# Patient Record
Sex: MALE | Race: BLACK OR AFRICAN AMERICAN | Employment: FULL TIME | URBAN - METROPOLITAN AREA
[De-identification: names, ages, dates, MRNs, and addresses within clinical notes are randomized per-mention and may not be internally consistent; named-entity substitution may affect disease eponyms.]

---

## 2018-05-28 PROBLEM — K52.9 COLITIS: Status: ACTIVE | Noted: 2018-05-28

## 2018-09-07 LAB — C DIFFICILE TOXIN, EIA: NORMAL

## 2018-09-08 LAB
CLOSTRIDIUM DIFFICILE DNA AMPLIFICATION: ABNORMAL
CLOSTRIDIUM DIFFICILE DNA AMPLIFICATION: ABNORMAL
ORGANISM: ABNORMAL

## 2018-11-21 LAB — C DIFFICILE TOXIN, EIA: NORMAL

## 2019-01-15 LAB — C DIFFICILE TOXIN, EIA: NORMAL

## 2019-01-16 LAB
A/G RATIO: 1.8 (ref 1.1–2.2)
ALBUMIN SERPL-MCNC: 4.6 G/DL (ref 3.4–5)
ALP BLD-CCNC: 48 U/L (ref 40–129)
ALT SERPL-CCNC: 28 U/L (ref 10–40)
ANION GAP SERPL CALCULATED.3IONS-SCNC: 14 MMOL/L (ref 3–16)
AST SERPL-CCNC: 24 U/L (ref 15–37)
BASOPHILS ABSOLUTE: 0.1 K/UL (ref 0–0.2)
BASOPHILS RELATIVE PERCENT: 0.9 %
BILIRUB SERPL-MCNC: 0.9 MG/DL (ref 0–1)
BUN BLDV-MCNC: 12 MG/DL (ref 7–20)
C-REACTIVE PROTEIN: 0.7 MG/L (ref 0–5.1)
CALCIUM SERPL-MCNC: 9.3 MG/DL (ref 8.3–10.6)
CHLORIDE BLD-SCNC: 97 MMOL/L (ref 99–110)
CO2: 23 MMOL/L (ref 21–32)
CREAT SERPL-MCNC: 1 MG/DL (ref 0.9–1.3)
EOSINOPHILS ABSOLUTE: 0.8 K/UL (ref 0–0.6)
EOSINOPHILS RELATIVE PERCENT: 8 %
GFR AFRICAN AMERICAN: >60
GFR NON-AFRICAN AMERICAN: >60
GLOBULIN: 2.5 G/DL
GLUCOSE BLD-MCNC: 94 MG/DL (ref 70–99)
HCT VFR BLD CALC: 46 % (ref 40.5–52.5)
HEMOGLOBIN: 16.2 G/DL (ref 13.5–17.5)
LYMPHOCYTES ABSOLUTE: 1.3 K/UL (ref 1–5.1)
LYMPHOCYTES RELATIVE PERCENT: 12.7 %
MCH RBC QN AUTO: 34 PG (ref 26–34)
MCHC RBC AUTO-ENTMCNC: 35.3 G/DL (ref 31–36)
MCV RBC AUTO: 96.4 FL (ref 80–100)
MONOCYTES ABSOLUTE: 0.9 K/UL (ref 0–1.3)
MONOCYTES RELATIVE PERCENT: 8.6 %
NEUTROPHILS ABSOLUTE: 7.2 K/UL (ref 1.7–7.7)
NEUTROPHILS RELATIVE PERCENT: 69.8 %
PDW BLD-RTO: 13.2 % (ref 12.4–15.4)
PLATELET # BLD: 359 K/UL (ref 135–450)
PMV BLD AUTO: 7.7 FL (ref 5–10.5)
POTASSIUM SERPL-SCNC: 4.6 MMOL/L (ref 3.5–5.1)
RBC # BLD: 4.77 M/UL (ref 4.2–5.9)
SODIUM BLD-SCNC: 134 MMOL/L (ref 136–145)
TOTAL PROTEIN: 7.1 G/DL (ref 6.4–8.2)
WBC # BLD: 10.3 K/UL (ref 4–11)

## 2019-01-19 LAB — CYTOMEGALOVIRUS IGM ANTIBODY: <8 AU/ML

## 2019-01-20 LAB
CYTOMEGALOVIRUS PCR DETECTION: NOT DETECTED
CYTOMEGALOVIRUS SOURCE: NORMAL

## 2019-07-24 LAB
A/G RATIO: 1.9 (ref 1.1–2.2)
ALBUMIN SERPL-MCNC: 4.5 G/DL (ref 3.4–5)
ALP BLD-CCNC: 65 U/L (ref 40–129)
ALT SERPL-CCNC: 33 U/L (ref 10–40)
ANION GAP SERPL CALCULATED.3IONS-SCNC: 15 MMOL/L (ref 3–16)
AST SERPL-CCNC: 33 U/L (ref 15–37)
BILIRUB SERPL-MCNC: 0.7 MG/DL (ref 0–1)
BUN BLDV-MCNC: 20 MG/DL (ref 7–20)
CALCIUM SERPL-MCNC: 9.9 MG/DL (ref 8.3–10.6)
CHLORIDE BLD-SCNC: 98 MMOL/L (ref 99–110)
CO2: 23 MMOL/L (ref 21–32)
CREAT SERPL-MCNC: 1.2 MG/DL (ref 0.9–1.3)
GFR AFRICAN AMERICAN: >60
GFR NON-AFRICAN AMERICAN: >60
GLOBULIN: 2.4 G/DL
GLUCOSE BLD-MCNC: 95 MG/DL (ref 70–99)
POTASSIUM SERPL-SCNC: 5.9 MMOL/L (ref 3.5–5.1)
SODIUM BLD-SCNC: 136 MMOL/L (ref 136–145)
TOTAL PROTEIN: 6.9 G/DL (ref 6.4–8.2)

## 2019-08-12 LAB — POTASSIUM SERPL-SCNC: 4.3 MMOL/L (ref 3.5–5.1)

## 2020-02-07 LAB
A/G RATIO: 2 (ref 1.1–2.2)
ALBUMIN SERPL-MCNC: 4.9 G/DL (ref 3.4–5)
ALP BLD-CCNC: 64 U/L (ref 40–129)
ALT SERPL-CCNC: 30 U/L (ref 10–40)
ANION GAP SERPL CALCULATED.3IONS-SCNC: 15 MMOL/L (ref 3–16)
AST SERPL-CCNC: 26 U/L (ref 15–37)
BASOPHILS ABSOLUTE: 0.1 K/UL (ref 0–0.2)
BASOPHILS RELATIVE PERCENT: 1.1 %
BILIRUB SERPL-MCNC: 0.8 MG/DL (ref 0–1)
BUN BLDV-MCNC: 24 MG/DL (ref 7–20)
C-REACTIVE PROTEIN: 0.9 MG/L (ref 0–5.1)
CALCIUM SERPL-MCNC: 9.5 MG/DL (ref 8.3–10.6)
CHLORIDE BLD-SCNC: 97 MMOL/L (ref 99–110)
CHOLESTEROL, TOTAL: 217 MG/DL (ref 0–199)
CO2: 22 MMOL/L (ref 21–32)
CREAT SERPL-MCNC: 1.3 MG/DL (ref 0.9–1.3)
EOSINOPHILS ABSOLUTE: 0.1 K/UL (ref 0–0.6)
EOSINOPHILS RELATIVE PERCENT: 0.9 %
FERRITIN: 808.7 NG/ML (ref 30–400)
GFR AFRICAN AMERICAN: >60
GFR NON-AFRICAN AMERICAN: 57
GLOBULIN: 2.5 G/DL
GLUCOSE BLD-MCNC: 94 MG/DL (ref 70–99)
HCT VFR BLD CALC: 48.4 % (ref 40.5–52.5)
HDLC SERPL-MCNC: 42 MG/DL (ref 40–60)
HEMOGLOBIN: 16.8 G/DL (ref 13.5–17.5)
IGA: 224 MG/DL (ref 70–400)
IRON: 67 UG/DL (ref 59–158)
LDL CHOLESTEROL CALCULATED: 146 MG/DL
LYMPHOCYTES ABSOLUTE: 1 K/UL (ref 1–5.1)
LYMPHOCYTES RELATIVE PERCENT: 11.4 %
MCH RBC QN AUTO: 33.4 PG (ref 26–34)
MCHC RBC AUTO-ENTMCNC: 34.7 G/DL (ref 31–36)
MCV RBC AUTO: 96.3 FL (ref 80–100)
MONOCYTES ABSOLUTE: 0.8 K/UL (ref 0–1.3)
MONOCYTES RELATIVE PERCENT: 9.9 %
NEUTROPHILS ABSOLUTE: 6.5 K/UL (ref 1.7–7.7)
NEUTROPHILS RELATIVE PERCENT: 76.7 %
PDW BLD-RTO: 13.3 % (ref 12.4–15.4)
PLATELET # BLD: 303 K/UL (ref 135–450)
PMV BLD AUTO: 8.2 FL (ref 5–10.5)
POTASSIUM SERPL-SCNC: 4.8 MMOL/L (ref 3.5–5.1)
RBC # BLD: 5.03 M/UL (ref 4.2–5.9)
SODIUM BLD-SCNC: 134 MMOL/L (ref 136–145)
TOTAL IRON BINDING CAPACITY: 290 UG/DL (ref 260–445)
TOTAL PROTEIN: 7.4 G/DL (ref 6.4–8.2)
TRIGL SERPL-MCNC: 143 MG/DL (ref 0–150)
VLDLC SERPL CALC-MCNC: 29 MG/DL
WBC # BLD: 8.5 K/UL (ref 4–11)

## 2020-02-08 LAB — TISSUE TRANSGLUTAMINASE IGA: <0.5 U/ML (ref 0–14)

## 2021-04-23 LAB — FERRITIN: 664.9 NG/ML (ref 30–400)

## 2021-04-24 LAB — VITAMIN D 25-HYDROXY: 31.4 NG/ML

## 2022-02-07 RX ORDER — ONDANSETRON 4 MG/1
4 TABLET, FILM COATED ORAL EVERY 8 HOURS PRN
COMMUNITY

## 2022-02-07 NOTE — PROGRESS NOTES
C-Difficile admission screening and protocol:     * Admitted with diarrhea? YES____    NO__X___     *Prior history of C-Diff. In last 3 months? YES____   NO__X___     *Antibiotic use in the past 6-8 weeks? NO____X__YES______                 If yes which  ANTIBIOTIC AND REASON______     *Prior hospitalization or nursing home in the last month?  YES____   NO_X__

## 2022-02-07 NOTE — PROGRESS NOTES
[address] [facility] time___0915_________        Surgery time_____1045_______    Take the following medications with a sip of water: Follow your MD/Surgeons pre-procedure instructions regarding your medications    Do not eat or drink anything after 12:00 midnight prior to your surgery. This includes water chewing gum, mints and ice chips. You may brush your teeth and gargle the morning of your surgery, but do not swallow the water     Please see your family doctor/pediatrician for a history and physical and/or concerning medications. Bring any test results/reports from your physicians office. If you are under the care of a heart doctor or specialist doctor, please be aware that you may be asked to them for clearance    You may be asked to stop blood thinners such as Coumadin, Plavix, Fragmin, Lovenox, etc., or any anti-inflammatories such as:  Aspirin, Ibuprofen, Advil, Naproxen prior to your surgery. We also ask that you stop any OTC medications such as fish oil, vitamin E, glucosamine, garlic, Multivitamins, COQ 10, etc.    We ask that you do not smoke 24 hours prior to surgery  We ask that you do not  drink any alcoholic beverages 24 hours prior to surgery     You must make arrangements for a responsible adult to take you home after your surgery. For your safety you will not be allowed to leave alone or drive yourself home. Your surgery will be cancelled if you do not have a ride home. Also for your safety, it is strongly suggested that someone stay with you the first 24 hours after your surgery. A parent or legal guardian must accompany a child scheduled for surgery and plan to stay at the hospital until the child is discharged. Please do not bring other children with you. For your comfort, please wear simple loose fitting clothing to the hospital.  Please do not bring valuables.     Do not wear any make-up or nail polish on your fingers or toes      For your safety, please do not wear any jewelry or body piercing's on the day of surgery. All jewelry must be removed. If you have dentures, they will be removed before going to operating room. For your convenience, we will provide you with a container. If you wear contact lenses or glasses, they will be removed, please bring a case for them. If you have a living will and a durable power of  for healthcare, please bring in a copy. As part of our patient safety program to minimize surgical site infections, we ask you to do the following:    · Please notify your surgeon if you develop any illness between         now and the  day of your surgery. · This includes a cough, cold, fever, sore throat, nausea,         or vomiting, and diarrhea, etc.  ·  Please notify your surgeon if you experience dizziness, shortness         of breath or blurred vision between now and the time of your surgery. Do not shave your operative site 96 hours prior to surgery. For face and neck surgery, men may use an electric razor 48 hours   prior to surgery. You may shower the night before surgery or the morning of   your surgery with an antibacterial soap. You will need to bring a photo ID and insurance card    [facility] has an onsite pharmacy, would you like to utilize our pharmacy     If you will be staying overnight and use a C-pap machine, please bring   your C-pap to hospital     Our goal is to provide you with excellent care, therefore, visitors will be limited to two(2) in the room at a time so that we may focus on providing this care for you. Please contact pre-admission testing if you have any further questions. [facility] phone number:  [phone] [facility] fax number:  [phone]  Please note these are generalized instructions for all surgical cases, you may be provided with more specific instructions according to your surgery.

## 2022-02-09 ENCOUNTER — ANESTHESIA EVENT (OUTPATIENT)
Dept: ENDOSCOPY | Age: 57
End: 2022-02-09
Payer: COMMERCIAL

## 2022-02-10 ENCOUNTER — ANESTHESIA (OUTPATIENT)
Dept: ENDOSCOPY | Age: 57
End: 2022-02-10
Payer: COMMERCIAL

## 2022-02-10 ENCOUNTER — HOSPITAL ENCOUNTER (OUTPATIENT)
Age: 57
Setting detail: OUTPATIENT SURGERY
Discharge: HOME | End: 2022-02-10
Attending: INTERNAL MEDICINE | Admitting: INTERNAL MEDICINE
Payer: COMMERCIAL

## 2022-02-10 VITALS
OXYGEN SATURATION: 95 % | TEMPERATURE: 97.2 F | HEIGHT: 69 IN | DIASTOLIC BLOOD PRESSURE: 74 MMHG | BODY MASS INDEX: 26.91 KG/M2 | WEIGHT: 181.66 LBS | SYSTOLIC BLOOD PRESSURE: 111 MMHG | HEART RATE: 60 BPM | RESPIRATION RATE: 16 BRPM

## 2022-02-10 VITALS — TEMPERATURE: 96.8 F | SYSTOLIC BLOOD PRESSURE: 103 MMHG | DIASTOLIC BLOOD PRESSURE: 64 MMHG | OXYGEN SATURATION: 94 %

## 2022-02-10 DIAGNOSIS — K50.119 CROHN'S DISEASE OF COLON WITH COMPLICATION (HCC): ICD-10-CM

## 2022-02-10 PROCEDURE — 3609010600 HC COLONOSCOPY POLYPECTOMY SNARE/COLD BIOPSY: Performed by: INTERNAL MEDICINE

## 2022-02-10 PROCEDURE — 2500000003 HC RX 250 WO HCPCS

## 2022-02-10 PROCEDURE — 7100000011 HC PHASE II RECOVERY - ADDTL 15 MIN: Performed by: INTERNAL MEDICINE

## 2022-02-10 PROCEDURE — 7100000010 HC PHASE II RECOVERY - FIRST 15 MIN: Performed by: INTERNAL MEDICINE

## 2022-02-10 PROCEDURE — 6360000002 HC RX W HCPCS

## 2022-02-10 PROCEDURE — 2709999900 HC NON-CHARGEABLE SUPPLY: Performed by: INTERNAL MEDICINE

## 2022-02-10 PROCEDURE — 7100000000 HC PACU RECOVERY - FIRST 15 MIN: Performed by: INTERNAL MEDICINE

## 2022-02-10 PROCEDURE — 6360000002 HC RX W HCPCS: Performed by: INTERNAL MEDICINE

## 2022-02-10 PROCEDURE — 2580000003 HC RX 258: Performed by: ANESTHESIOLOGY

## 2022-02-10 PROCEDURE — 88305 TISSUE EXAM BY PATHOLOGIST: CPT

## 2022-02-10 PROCEDURE — 7100000001 HC PACU RECOVERY - ADDTL 15 MIN: Performed by: INTERNAL MEDICINE

## 2022-02-10 PROCEDURE — 3700000000 HC ANESTHESIA ATTENDED CARE: Performed by: INTERNAL MEDICINE

## 2022-02-10 PROCEDURE — 3700000001 HC ADD 15 MINUTES (ANESTHESIA): Performed by: INTERNAL MEDICINE

## 2022-02-10 PROCEDURE — 3609027000 HC COLONOSCOPY: Performed by: INTERNAL MEDICINE

## 2022-02-10 RX ORDER — SODIUM CHLORIDE 9 MG/ML
INJECTION, SOLUTION INTRAVENOUS CONTINUOUS
Status: DISCONTINUED | OUTPATIENT
Start: 2022-02-10 | End: 2022-02-10 | Stop reason: HOSPADM

## 2022-02-10 RX ORDER — LIDOCAINE HYDROCHLORIDE 20 MG/ML
INJECTION, SOLUTION EPIDURAL; INFILTRATION; INTRACAUDAL; PERINEURAL PRN
Status: DISCONTINUED | OUTPATIENT
Start: 2022-02-10 | End: 2022-02-10 | Stop reason: SDUPTHER

## 2022-02-10 RX ORDER — SODIUM CHLORIDE 9 MG/ML
25 INJECTION, SOLUTION INTRAVENOUS PRN
Status: DISCONTINUED | OUTPATIENT
Start: 2022-02-10 | End: 2022-02-10 | Stop reason: HOSPADM

## 2022-02-10 RX ORDER — PROPOFOL 10 MG/ML
INJECTION, EMULSION INTRAVENOUS CONTINUOUS PRN
Status: DISCONTINUED | OUTPATIENT
Start: 2022-02-10 | End: 2022-02-10 | Stop reason: SDUPTHER

## 2022-02-10 RX ORDER — SODIUM CHLORIDE 0.9 % (FLUSH) 0.9 %
10 SYRINGE (ML) INJECTION PRN
Status: DISCONTINUED | OUTPATIENT
Start: 2022-02-10 | End: 2022-02-10 | Stop reason: HOSPADM

## 2022-02-10 RX ORDER — SODIUM CHLORIDE 0.9 % (FLUSH) 0.9 %
10 SYRINGE (ML) INJECTION EVERY 12 HOURS SCHEDULED
Status: DISCONTINUED | OUTPATIENT
Start: 2022-02-10 | End: 2022-02-10 | Stop reason: HOSPADM

## 2022-02-10 RX ADMIN — SODIUM CHLORIDE: 9 INJECTION, SOLUTION INTRAVENOUS at 09:40

## 2022-02-10 RX ADMIN — SODIUM CHLORIDE: 9 INJECTION, SOLUTION INTRAVENOUS at 10:33

## 2022-02-10 RX ADMIN — LIDOCAINE HYDROCHLORIDE 100 MG: 20 INJECTION, SOLUTION EPIDURAL; INFILTRATION; INTRACAUDAL; PERINEURAL at 10:39

## 2022-02-10 RX ADMIN — PROPOFOL 200 MCG/KG/MIN: 10 INJECTION, EMULSION INTRAVENOUS at 10:39

## 2022-02-10 ASSESSMENT — PULMONARY FUNCTION TESTS
PIF_VALUE: 1

## 2022-02-10 ASSESSMENT — PAIN SCALES - GENERAL
PAINLEVEL_OUTOF10: 0

## 2022-02-10 ASSESSMENT — ENCOUNTER SYMPTOMS: SHORTNESS OF BREATH: 0

## 2022-02-10 ASSESSMENT — PAIN - FUNCTIONAL ASSESSMENT: PAIN_FUNCTIONAL_ASSESSMENT: 0-10

## 2022-02-10 NOTE — OP NOTE
endoscopy suite, placed in the left lateral decubitus position, and the above IV anesthesia was administered. A digital rectal examination was performed and revealed negative without mass, lesions or tenderness, internal hemorrhoids noted. The Olympus video colonoscope was placed in the patient's rectum under digital direction and advanced to the cecum. The cecum was identified by characteristic anatomy and ballottment. The ileocecal valve was identified. The preparation was good. The terminal ileum was normal appearing. The scope was then withdrawn back through the cecum, ascending, transverse, descending, sigmoid colon, and rectum. Careful circumferential examination of the mucosa in these areas demonstrated:     1) Successful chromoendoscopy using methylene blue was performed per protocol using a braydon-tip spray catheter. 2) The right colon was without nodularity and targeted biopsies were taken every 10 cm x 4 quadrants. 3) The left colon was examined. There were two areas of nodular mucosal changes noted. The first was from 35-40cm from the anal verge (junction of sigmoid and descending colon), the second was in the proximal rectum (10 cm from the anal verge). These areas were biopsied. Additionally the left colon was biopsied per protocol every 10 cm x 4 quadrants. The scope was then withdrawn into the rectum and retroflexed. The retroflexed view of the anal verge and rectum demonstrates small internal hemorrhoids. The scope was straightened, the colon was decompressed and the scope was withdrawn from the patient. The patient tolerated the procedure well and was taken to the PACU in good condition. Estimated blood loss: <5ml    ID Type Source Tests Collected by Time Destination   A : A. RIGHT SIDE COLON BIOPSY, HISTORY OF CROHNS, RULE OUT DYSPLASIA Tissue Colon SURGICAL PATHOLOGY Mana Prieto., DO 2/10/2022 1056    B : B.  LEFT SIDE COLON BIOPSY, HISTORY OF CROHNS, RULE OUT DYSPLASIA Tissue Colon SURGICAL PATHOLOGY [facility] DO 2/10/2022 1101    C : C. COLON BIOPSY, 35-40 NODULARITY, HISTORY OF CROHNS, RULE OUT DYSPLASIA Tissue Colon SURGICAL PATHOLOGY [facility] DO 2/10/2022 1104    D : D. COLON BIOPSY, RECTAL NODULARY,  HISTORY OF CROHNS, RULE OUTDYSPLASIA Tissue Colon SURGICAL PATHOLOGY [facility] DO 2/10/2022 1108          Impression:  See post-procedure diagnoses. Recommendations:  Await pathology. Further surveillance recommendations will be made after the biopsies are reviewed. The patient had biopsies taken today. The patient should call for results in 7 days if they have not heard from our office.   Our number is [address], [address]  2/10/2022  [phone]

## 2022-02-10 NOTE — PROGRESS NOTES
Pt is alert and oriented, denies pain at this time. Pt and spouse given discharge instructions with time for questions. All lines removed and belongings sent with Pt at d/c.  Spouse Chelita to transport Pt home

## 2022-02-10 NOTE — ANESTHESIA POSTPROCEDURE EVALUATION
Department of Anesthesiology  Postprocedure Note    Patient: Sheba Hammond  MRN: [id number]  YOB: 1965  Date of evaluation: 2/10/2022  Time:  12:15 PM     Procedure Summary     Date: 02/10/22 Room / Location: [address] [place] [address]    Anesthesia Start: 1033 Anesthesia Stop: 1112    Procedures:       COLONOSCOPY  WITH CHROMOENDOSCOPY (N/A )      COLONOSCOPY POLYPECTOMY SNARE/COLD BIOPSY (N/A ) Diagnosis:       Crohn's disease of colon with complication ([address]      (CROHNS COLITIS)    Surgeons: Celestine Lai DO Responsible Provider: Mitzy Carlin MD    Anesthesia Type: MAC ASA Status: 3          Anesthesia Type: MAC    Mara Phase I: Mara Score: 10    Mara Phase II: Mara Score: 10    Last vitals: Reviewed and per EMR flowsheets.        Anesthesia Post Evaluation    Patient location during evaluation: PACU  Level of consciousness: awake and alert  Airway patency: patent  Nausea & Vomiting: no nausea and no vomiting  Complications: no  Cardiovascular status: blood pressure returned to baseline  Respiratory status: acceptable  Hydration status: euvolemic  Comments: Postoperative Anesthesia Note    Name:    Sheba Hammond  MRN:      [id number]    Patient Vitals in the past 12 hrs:  02/10/22 1206, BP:111/74, Temp:97.2 °F (36.2 °C), Pulse:60, Resp:16, SpO2:95 %  02/10/22 1147, BP:112/79, Temp:97 °F (36.1 °C), Temp src:Temporal, Pulse:58, Resp:16, SpO2:94 %  02/10/22 1140, BP:108/78, Temp:97.2 °F (36.2 °C), Temp src:Temporal, Pulse:58, Resp:14, SpO2:93 %  02/10/22 1135, BP:109/83, Pulse:62, Resp:18, SpO2:97 %  02/10/22 1130, BP:105/77, Pulse:56, Resp:15, SpO2:96 %  02/10/22 1125, BP:101/79, Pulse:59, Resp:13, SpO2:94 %  02/10/22 1120, BP:99/72, Pulse:62, Resp:14, SpO2:93 %  02/10/22 1115, BP:98/68, Temp:96.9 °F (36.1 °C), Temp src:Temporal, Pulse:63, Resp:14, SpO2:93 %  02/10/22 0932, BP:112/78, Temp:97 °F (36.1 °C), Temp src:Temporal, Pulse:64, Resp:16, SpO2:96 %, Height:5' 9\" (1.753 m), Weight:181 lb 10.5 oz (82.4 kg)     LABS:    CBC  Lab Results       Component                Value               Date/Time                  WBC                      8.5                 02/07/2020 04:43 PM        HGB                      16.8                02/07/2020 04:43 PM        HCT                      48.4                02/07/2020 04:43 PM        PLT                      303                 02/07/2020 04:43 PM   RENAL  Lab Results       Component                Value               Date/Time                  NA                       134 (L)             02/07/2020 04:43 PM        K                        4.8                 02/07/2020 04:43 PM        K                        3.5                 05/29/2018 08:55 AM        CL                       97 (L)              02/07/2020 04:43 PM        CO2                      22                  02/07/2020 04:43 PM        BUN                      24 (H)              02/07/2020 04:43 PM        CREATININE               1.3                 02/07/2020 04:43 PM        GLUCOSE                  94                  02/07/2020 04:43 PM   COAGS  No results found for: PROTIME, INR, APTT    Intake & Output:  @24HRIO@    Nausea & Vomiting:  No    Level of Consciousness:  Awake    Pain Assessment:  Adequate analgesia    Anesthesia Complications:  No apparent anesthetic complications    SUMMARY      Vital signs stable  OK to discharge from Stage I post anesthesia care.   Care transferred from Anesthesiology department on discharge from perioperative area

## 2022-02-10 NOTE — H&P
Pre-operative History and Physical    Patient: Marleny Duke  : 1965  Acct#:     Intended Procedure: Colonoscopy    HISTORY OF PRESENT ILLNESS:  The patient is a 60 yo WM with Crohn's colitis. He underwent a colonoscopy 2021 which revealed inactive Crohn's disease, however left colon biopsies with low-grade adenomatous dysplasia, suspicious for high-grade dysplasia. He has a repeat endoscopy scheduled for 2/10/2022. Past Medical History:        Diagnosis Date    [facility]     Anxiety     Cancer ([facility]     squamous cell skin cancer    Clostridium difficile diarrhea     Sleep apnea     uses a Cpap machine     Past Surgical History:        Procedure Laterality Date    BUNIONECTOMY Right     CHOLECYSTECTOMY      FRACTURE SURGERY Right     right foot    MOHS SURGERY      forehead     Medications Prior to Admission:   No current facility-administered medications on file prior to encounter. Current Outpatient Medications on File Prior to Encounter   Medication Sig Dispense Refill    sodium chloride 0.9 % SOLN 250 mL with vedolizumab 300 MG SOLR 300 mg Infuse 300 mg intravenously See Admin Instructions Every 4 weeks      ondansetron (ZOFRAN) 4 MG tablet Take 4 mg by mouth every 8 hours as needed for Nausea or Vomiting      predniSONE (DELTASONE) 5 MG tablet Take 20mg by mouth (4 tabs) for 3 days, then 10mg by mouth (2 tabs) for 3 days, then 5mg by mouth daily (Patient taking differently: Take 5 mg by mouth daily ) 30 tablet 0    dicyclomine (BENTYL) 20 MG tablet Take 1 tablet by mouth 4 times daily as needed      clotrimazole-betamethasone (LOTRISONE) 1-0.05 % cream Apply topically daily as needed Apply to feet as needed      mercaptopurine (PURINETHOL) 50 MG chemo tablet Take 75 mg by mouth daily Take one and a half  by mouth daily for Chrons      zolpidem (AMBIEN) 10 MG tablet Take 5 mg by mouth nightly as needed for Sleep.        nadolol (CORGARD) 20 MG tablet Take 20 mg by mouth daily      sertraline (ZOLOFT) 25 MG tablet Take 25 mg by mouth daily      omeprazole (PRILOSEC) 20 MG delayed release capsule Take 20 mg by mouth daily      fluticasone (FLONASE) 50 MCG/ACT nasal spray 2 sprays by Nasal route daily as needed           Allergies:  Patient has no known allergies. Social History:   TOBACCO:   reports that he has never smoked. He has never used smokeless tobacco.  ETOH:   reports no history of alcohol use. DRUGS:   reports no history of drug use. PHYSICAL EXAM:      Vital Signs: /78   Pulse 64   Temp 97 °F (36.1 °C) (Temporal)   Resp 16   Ht 5' 9\" (1.753 m)   Wt 181 lb 10.5 oz (82.4 kg)   SpO2 96%   BMI 26.83 kg/m²    Airway: No stridor or wheezing noted. Good air movement  Pulmonary: without wheezes. Clear to auscultation  Cardiac:regular rate and rhythm without loud murmurs  Abdomen:soft, nontender,  Bowel sounds present    Pre-Procedure Assessment / Plan:  1) 62 yo WM with Crohn's colitis. He underwent a colonoscopy 9/2021 which revealed inactive Crohn's disease, however left colon biopsies with low-grade adenomatous dysplasia, suspicious for high-grade dysplasia. He has a repeat endoscopy scheduled for 2/10/2022. ASA Grade:  ASA 3 - Patient with moderate systemic disease with functional limitations  Mallampati Classification:  Class II    Level of Sedation Plan:Deep sedation    Post Procedure plan: Return to same level of care    I assessed the patient and find that the patient is in satisfactory condition to proceed with the planned procedure and sedation plan. I have explained the risk, benefits, and alternatives to the procedure; the patient understands and agrees to proceed. The patient was counseled at length about the risks of dean Covid-19 during their perioperative period and any recovery window from their procedure.   The patient was made aware that dean Covid-19  may worsen their prognosis for recovering from their procedure  and lend to a higher morbidity and/or mortality risk. All material risks, benefits, and reasonable alternatives including postponing the procedure were discussed. The patient does wish to proceed with the procedure at this time.       [employer] Aguilar,   2/10/2022

## 2022-02-10 NOTE — ANESTHESIA PRE PROCEDURE
Department of Anesthesiology  Preprocedure Note       Name:  Mirian Cavazos   Age:  61 y.o.  :  1965                                          MRN:  [id number]         Date:  2/10/2022      Surgeon: Margarette Reyna):  Mana Prieto.,     Procedure: Procedure(s):  COLONOSCOPY  WITH CHROMOENDOSCOPY    Medications prior to admission:   Prior to Admission medications    Medication Sig Start Date End Date Taking? Authorizing Provider   sodium chloride 0.9 % SOLN 250 mL with vedolizumab 300 MG SOLR 300 mg Infuse 300 mg intravenously See Admin Instructions Every 4 weeks   Yes Historical Provider, MD   ondansetron (ZOFRAN) 4 MG tablet Take 4 mg by mouth every 8 hours as needed for Nausea or Vomiting   Yes Historical Provider, MD   predniSONE (DELTASONE) 5 MG tablet Take 20mg by mouth (4 tabs) for 3 days, then 10mg by mouth (2 tabs) for 3 days, then 5mg by mouth daily  Patient taking differently: Take 5 mg by mouth daily  18  Yes Mart Alex MD   dicyclomine (BENTYL) 20 MG tablet Take 1 tablet by mouth 4 times daily as needed 18  Yes Historical Provider, MD   clotrimazole-betamethasone (Cloretta Mins) 1-0.05 % cream Apply topically daily as needed Apply to feet as needed 18  Yes Historical Provider, MD   mercaptopurine (PURINETHOL) 50 MG chemo tablet Take 75 mg by mouth daily Take one and a half  by mouth daily for Chrons 18  Yes Historical Provider, MD   zolpidem (AMBIEN) 10 MG tablet Take 5 mg by mouth nightly as needed for Sleep.     Yes Historical Provider, MD   nadolol (CORGARD) 20 MG tablet Take 20 mg by mouth daily   Yes Historical Provider, MD   sertraline (ZOLOFT) 25 MG tablet Take 25 mg by mouth daily   Yes Historical Provider, MD   omeprazole (PRILOSEC) 20 MG delayed release capsule Take 20 mg by mouth daily   Yes Historical Provider, MD   fluticasone (FLONASE) 50 MCG/ACT nasal spray 2 sprays by Nasal route daily as needed    Yes Historical Provider, MD       Current medications: Current Facility-Administered Medications   Medication Dose Route Frequency Provider Last Rate Last Admin    0.9 % sodium chloride infusion   IntraVENous Continuous Eliana Godoy  mL/hr at 02/10/22 0940 New Bag at 02/10/22 0940    sodium chloride flush 0.9 % injection 10 mL  10 mL IntraVENous 2 times per day Eliana Godoy MD        sodium chloride flush 0.9 % injection 10 mL  10 mL IntraVENous PRN Eliana Godoy MD        0.9 % sodium chloride infusion  25 mL IntraVENous PRN Eliana Godoy MD           Allergies:  No Known Allergies    Problem List:    Patient Active Problem List   Diagnosis Code    Colitis K52.9       Past Medical History:        Diagnosis Date    [facility]     Anxiety     Cancer ([facility]     squamous cell skin cancer    Clostridium difficile diarrhea     Sleep apnea     uses a Cpap machine       Past Surgical History:        Procedure Laterality Date    BUNIONECTOMY Right     CHOLECYSTECTOMY      FRACTURE SURGERY Right     right foot    MOHS SURGERY      forehead       Social History:    Social History     Tobacco Use    Smoking status: Never Smoker    Smokeless tobacco: Never Used   Substance Use Topics    Alcohol use:  No                                Counseling given: Not Answered      Vital Signs (Current):   Vitals:    02/07/22 1056 02/10/22 0932   BP:  112/78   Pulse:  64   Resp:  16   Temp:  97 °F (36.1 °C)   TempSrc:  Temporal   SpO2:  96%   Weight: 186 lb (84.4 kg) 181 lb 10.5 oz (82.4 kg)   Height: 5' 9\" (1.753 m) 5' 9\" (1.753 m)                                              BP Readings from Last 3 Encounters:   02/10/22 112/78   09/20/18 127/81   05/31/18 103/60       NPO Status: Time of last liquid consumption: 0500                        Time of last solid consumption: 1400                        Date of last liquid consumption: 02/10/22                        Date of last solid food consumption: 02/09/22    BMI:   Wt Readings from Last 3 Encounters:   02/10/22 181 lb 10.5 oz (82.4 kg)   09/20/18 189 lb 6 oz (85.9 kg)   05/29/18 190 lb 11.2 oz (86.5 kg)     Body mass index is 26.83 kg/m². CBC:   Lab Results   Component Value Date    WBC 8.5 02/07/2020    RBC 5.03 02/07/2020    HGB 16.8 02/07/2020    HCT 48.4 02/07/2020    MCV 96.3 02/07/2020    RDW 13.3 02/07/2020     02/07/2020       CMP:   Lab Results   Component Value Date     02/07/2020    K 4.8 02/07/2020    K 3.5 05/29/2018    CL 97 02/07/2020    CO2 22 02/07/2020    BUN 24 02/07/2020    CREATININE 1.3 02/07/2020    GFRAA >60 02/07/2020    AGRATIO 2.0 02/07/2020    LABGLOM 57 02/07/2020    GLUCOSE 94 02/07/2020    PROT 7.4 02/07/2020    CALCIUM 9.5 02/07/2020    BILITOT 0.8 02/07/2020    ALKPHOS 64 02/07/2020    AST 26 02/07/2020    ALT 30 02/07/2020       POC Tests: No results for input(s): POCGLU, POCNA, POCK, POCCL, POCBUN, POCHEMO, POCHCT in the last 72 hours.     Coags: No results found for: PROTIME, INR, APTT    HCG (If Applicable): No results found for: PREGTESTUR, PREGSERUM, HCG, HCGQUANT     ABGs: No results found for: PHART, PO2ART, UAD1MFN, UEG5EUM, BEART, [id number]     Type & Screen (If Applicable):  No results found for: LABABO, LABRH    Drug/Infectious Status (If Applicable):  No results found for: HIV, HEPCAB    COVID-19 Screening (If Applicable): No results found for: COVID19        Anesthesia Evaluation  Patient summary reviewed and Nursing notes reviewed  Airway: Mallampati: II        Dental:          Pulmonary:   (+) sleep apnea:      (-) COPD, asthma and shortness of breath                           Cardiovascular:        (-) hypertension, valvular problems/murmurs, past MI, CAD, CABG/stent, dysrhythmias,  angina and no hyperlipidemia                Neuro/Psych:      (-) seizures, TIA and CVA           GI/Hepatic/Renal:        (-) GERD, PUD, liver disease and no renal disease       Endo/Other:        (-) diabetes mellitus               Abdominal: Vascular: negative vascular ROS. Other Findings:             Anesthesia Plan      MAC     ASA 3     (I discussed intravenous sedation to the patient's satisfaction including risks and alternatives. The patient agreed with the plan and has no further questions. J Carlos Figueredo MD )  Induction: intravenous. Anesthetic plan and risks discussed with patient. Plan discussed with CRNA.                   J Carlos Figueredo MD   2/10/2022

## 2022-02-10 NOTE — PROGRESS NOTES
Pt to phase 2 from PACU, Pt is alert and oriented, denies pain at this time.  Pt sitting up eating and drinking a snack

## 2022-04-05 NOTE — FLOWSHEET NOTE
Preoperative Screening for Elective Surgery/Invasive Procedures While COVID-19 present in the community     1. Have you tested positive or have been told to self-isolate for COVID-19 like symptoms within the past 28 days? 2. Do you currently have any of the following symptoms? ? Fever >100.0 F or 99.9 F in immunocompromised patients? ? New onset cough, shortness of breath or difficulty breathing? ? New onset sore throat, myalgia (muscle aches and pains), headache, loss of taste/smell or diarrhea? 3. Have you had a potential exposure to COVID-19 within the past 14 days by:  ? Close contact with a confirmed case? ? Close contact with a healthcare worker,  or essential infrastructure worker (grocery store, [employer], gas station, public utilities or transportation)? ? Do you reside in a congregate setting such as; skilled nursing facility, adult home, correctional facility, homeless shelter or other institutional setting? ? Have you had recent travel to a known COVID-19 hotspot? * Admitted with diarrhea? [] YES    [x]  NO     *Prior history of C-Diff. In last 3 months? [] YES    [x]  NO     *Antibiotic use in the past 6-8 weeks? [x]  NO    []  YES      If yes, which: REASON_________________     *Prior hospitalization or nursing home in the last month? []  YES    [x]  NO     SAFETY FIRST. .call before you fall    [address] [facility] time___4/14/22 0830_________        Surgery time____1000_______    Do not eat or drink anything after 12:00 midnight prior to your surgery. This includes water chewing gum, mints and ice chips- the Day of Surgery. You may brush your teeth and gargle the morning of your surgery, but do not swallow the water     Please see your family doctor/pediatrician for a history and physical and/or questions concerning medications. Bring any test results/reports from your physicians office.    If you are under the care of a heart doctor or specialist doctor, please be aware that you may be asked to them for clearance    You may be asked to stop blood thinners such as Coumadin, Plavix, Fragmin, Lovenox, etc., or any anti-inflammatories such as:  Aspirin, Ibuprofen, Advil, Naproxen prior to your surgery. We also ask that you stop any OTC medications such as fish oil, vitamin E, glucosamine, garlic, Multivitamins, COQ 10, etc.    We ask that you do not smoke 24 hours prior to surgery  We ask that you do not  drink any alcoholic beverages 24 hours prior to surgery     You must make arrangements for a responsible adult to take you home after your surgery. For your safety you will not be allowed to leave alone or drive yourself home. Your surgery will be cancelled if you do not have a ride home. Also for your safety, it is strongly suggested that someone stay with you the first 24 hours after your surgery. A parent or legal guardian must accompany a child scheduled for surgery and plan to stay at the hospital until the child is discharged. Please do not bring other children with you. For your comfort, please wear simple loose fitting clothing to the hospital.  Please do not bring valuables. Do not wear any make-up or nail polish on your fingers or toes. For your safety, please do not wear any jewelry or body piercing's on the day of surgery. All jewelry must be removed. If you have dentures, they will be removed before going to operating room. For your convenience, we will provide you with a container. If you wear contact lenses or glasses, they will be removed, please bring a case for them. If you have a living will and a durable power of  for healthcare, please bring in a copy.      As part of our patient safety program to minimize surgical site infections, we ask you to do the following:    · Please notify your surgeon if you develop any illness between         now and the day of your surgery. · This includes a cough, cold, fever, sore throat, nausea,         or vomiting, and diarrhea, etc.  ·  Please notify your surgeon if you experience dizziness, shortness         of breath or blurred vision between now and the time of your surgery. Do not shave your operative site 96 hours prior to surgery. For face and neck surgery, men may use an electric razor 48 hours   prior to surgery. You may shower the night before surgery or the morning of   your surgery with an antibacterial soap. You will need to bring a photo ID and insurance card     If you use a C-pap or Bi-pap machine, please bring your machine with you to the hospital     Our goal is to provide you with excellent care, therefore, visitors will be limited to so that we may focus on providing this care for you. Please contact your surgeon office, if you have any further questions. [facility] phone number:  [address] PAT fax number:  [phone]    Please note these are generalized instructions for all surgical cases, you may be provided with more specific instructions according to your surgery.

## 2022-04-13 ENCOUNTER — ANESTHESIA EVENT (OUTPATIENT)
Dept: ENDOSCOPY | Age: 57
End: 2022-04-13
Payer: COMMERCIAL

## 2022-04-14 ENCOUNTER — HOSPITAL ENCOUNTER (OUTPATIENT)
Age: 57
Setting detail: OUTPATIENT SURGERY
Discharge: HOME | End: 2022-04-14
Attending: INTERNAL MEDICINE | Admitting: INTERNAL MEDICINE
Payer: COMMERCIAL

## 2022-04-14 ENCOUNTER — ANESTHESIA (OUTPATIENT)
Dept: ENDOSCOPY | Age: 57
End: 2022-04-14
Payer: COMMERCIAL

## 2022-04-14 VITALS
BODY MASS INDEX: 27.04 KG/M2 | HEIGHT: 69 IN | SYSTOLIC BLOOD PRESSURE: 115 MMHG | OXYGEN SATURATION: 93 % | HEART RATE: 59 BPM | DIASTOLIC BLOOD PRESSURE: 74 MMHG | WEIGHT: 182.54 LBS | RESPIRATION RATE: 18 BRPM | TEMPERATURE: 97.3 F

## 2022-04-14 VITALS
DIASTOLIC BLOOD PRESSURE: 69 MMHG | RESPIRATION RATE: 14 BRPM | SYSTOLIC BLOOD PRESSURE: 109 MMHG | OXYGEN SATURATION: 95 %

## 2022-04-14 DIAGNOSIS — K50.119 CROHN'S DISEASE OF COLON WITH COMPLICATION (HCC): ICD-10-CM

## 2022-04-14 PROCEDURE — 2720000010 HC SURG SUPPLY STERILE: Performed by: INTERNAL MEDICINE

## 2022-04-14 PROCEDURE — 2580000003 HC RX 258: Performed by: ANESTHESIOLOGY

## 2022-04-14 PROCEDURE — 3700000000 HC ANESTHESIA ATTENDED CARE: Performed by: INTERNAL MEDICINE

## 2022-04-14 PROCEDURE — 7100000001 HC PACU RECOVERY - ADDTL 15 MIN: Performed by: INTERNAL MEDICINE

## 2022-04-14 PROCEDURE — 88305 TISSUE EXAM BY PATHOLOGIST: CPT

## 2022-04-14 PROCEDURE — 3609027000 HC COLONOSCOPY: Performed by: INTERNAL MEDICINE

## 2022-04-14 PROCEDURE — 2580000003 HC RX 258: Performed by: NURSE ANESTHETIST, CERTIFIED REGISTERED

## 2022-04-14 PROCEDURE — 6360000002 HC RX W HCPCS: Performed by: INTERNAL MEDICINE

## 2022-04-14 PROCEDURE — 2709999900 HC NON-CHARGEABLE SUPPLY: Performed by: INTERNAL MEDICINE

## 2022-04-14 PROCEDURE — 7100000011 HC PHASE II RECOVERY - ADDTL 15 MIN: Performed by: INTERNAL MEDICINE

## 2022-04-14 PROCEDURE — 2500000003 HC RX 250 WO HCPCS: Performed by: NURSE ANESTHETIST, CERTIFIED REGISTERED

## 2022-04-14 PROCEDURE — 6360000002 HC RX W HCPCS: Performed by: NURSE ANESTHETIST, CERTIFIED REGISTERED

## 2022-04-14 PROCEDURE — 3609010200 HC COLONOSCOPY ABLATION TUMOR POLYP/OTHER LES: Performed by: INTERNAL MEDICINE

## 2022-04-14 PROCEDURE — 7100000010 HC PHASE II RECOVERY - FIRST 15 MIN: Performed by: INTERNAL MEDICINE

## 2022-04-14 PROCEDURE — 3609010600 HC COLONOSCOPY POLYPECTOMY SNARE/COLD BIOPSY: Performed by: INTERNAL MEDICINE

## 2022-04-14 PROCEDURE — 3609019800 HC COLONOSCOPY WITH SUBMUCOSAL INJECTION: Performed by: INTERNAL MEDICINE

## 2022-04-14 PROCEDURE — 2500000003 HC RX 250 WO HCPCS: Performed by: INTERNAL MEDICINE

## 2022-04-14 PROCEDURE — 7100000000 HC PACU RECOVERY - FIRST 15 MIN: Performed by: INTERNAL MEDICINE

## 2022-04-14 PROCEDURE — 88342 IMHCHEM/IMCYTCHM 1ST ANTB: CPT

## 2022-04-14 PROCEDURE — 3700000001 HC ADD 15 MINUTES (ANESTHESIA): Performed by: INTERNAL MEDICINE

## 2022-04-14 RX ORDER — SODIUM CHLORIDE 9 MG/ML
INJECTION, SOLUTION INTRAVENOUS CONTINUOUS
Status: DISCONTINUED | OUTPATIENT
Start: 2022-04-14 | End: 2022-04-14 | Stop reason: HOSPADM

## 2022-04-14 RX ORDER — PROPOFOL 10 MG/ML
INJECTION, EMULSION INTRAVENOUS CONTINUOUS PRN
Status: DISCONTINUED | OUTPATIENT
Start: 2022-04-14 | End: 2022-04-14 | Stop reason: SDUPTHER

## 2022-04-14 RX ORDER — SODIUM CHLORIDE 0.9 % (FLUSH) 0.9 %
5-40 SYRINGE (ML) INJECTION PRN
Status: DISCONTINUED | OUTPATIENT
Start: 2022-04-14 | End: 2022-04-14 | Stop reason: HOSPADM

## 2022-04-14 RX ORDER — SODIUM CHLORIDE 0.9 % (FLUSH) 0.9 %
5-40 SYRINGE (ML) INJECTION EVERY 12 HOURS SCHEDULED
Status: DISCONTINUED | OUTPATIENT
Start: 2022-04-14 | End: 2022-04-14 | Stop reason: HOSPADM

## 2022-04-14 RX ORDER — SODIUM CHLORIDE 0.9 % (FLUSH) 0.9 %
10 SYRINGE (ML) INJECTION EVERY 12 HOURS SCHEDULED
Status: DISCONTINUED | OUTPATIENT
Start: 2022-04-14 | End: 2022-04-14 | Stop reason: HOSPADM

## 2022-04-14 RX ORDER — GLYCOPYRROLATE 0.2 MG/ML
INJECTION INTRAMUSCULAR; INTRAVENOUS PRN
Status: DISCONTINUED | OUTPATIENT
Start: 2022-04-14 | End: 2022-04-14 | Stop reason: SDUPTHER

## 2022-04-14 RX ORDER — SODIUM CHLORIDE 9 MG/ML
INJECTION, SOLUTION INTRAVENOUS PRN
Status: DISCONTINUED | OUTPATIENT
Start: 2022-04-14 | End: 2022-04-14 | Stop reason: HOSPADM

## 2022-04-14 RX ORDER — SODIUM CHLORIDE 9 MG/ML
INJECTION, SOLUTION INTRAVENOUS CONTINUOUS PRN
Status: DISCONTINUED | OUTPATIENT
Start: 2022-04-14 | End: 2022-04-14 | Stop reason: SDUPTHER

## 2022-04-14 RX ORDER — ONDANSETRON 2 MG/ML
4 INJECTION INTRAMUSCULAR; INTRAVENOUS
Status: DISCONTINUED | OUTPATIENT
Start: 2022-04-14 | End: 2022-04-14 | Stop reason: HOSPADM

## 2022-04-14 RX ORDER — PROPOFOL 10 MG/ML
INJECTION, EMULSION INTRAVENOUS PRN
Status: DISCONTINUED | OUTPATIENT
Start: 2022-04-14 | End: 2022-04-14 | Stop reason: SDUPTHER

## 2022-04-14 RX ORDER — SODIUM CHLORIDE 9 MG/ML
25 INJECTION, SOLUTION INTRAVENOUS PRN
Status: DISCONTINUED | OUTPATIENT
Start: 2022-04-14 | End: 2022-04-14 | Stop reason: HOSPADM

## 2022-04-14 RX ORDER — DIPHENHYDRAMINE HYDROCHLORIDE 50 MG/ML
12.5 INJECTION INTRAMUSCULAR; INTRAVENOUS
Status: DISCONTINUED | OUTPATIENT
Start: 2022-04-14 | End: 2022-04-14 | Stop reason: HOSPADM

## 2022-04-14 RX ORDER — SODIUM CHLORIDE 0.9 % (FLUSH) 0.9 %
10 SYRINGE (ML) INJECTION PRN
Status: DISCONTINUED | OUTPATIENT
Start: 2022-04-14 | End: 2022-04-14 | Stop reason: HOSPADM

## 2022-04-14 RX ORDER — LIDOCAINE HYDROCHLORIDE 20 MG/ML
INJECTION, SOLUTION EPIDURAL; INFILTRATION; INTRACAUDAL; PERINEURAL PRN
Status: DISCONTINUED | OUTPATIENT
Start: 2022-04-14 | End: 2022-04-14 | Stop reason: SDUPTHER

## 2022-04-14 RX ADMIN — GLYCOPYRROLATE 0.2 MG: 0.2 INJECTION, SOLUTION INTRAMUSCULAR; INTRAVENOUS at 10:04

## 2022-04-14 RX ADMIN — PROPOFOL 180 MCG/KG/MIN: 10 INJECTION, EMULSION INTRAVENOUS at 10:05

## 2022-04-14 RX ADMIN — PROPOFOL 100 MG: 10 INJECTION, EMULSION INTRAVENOUS at 10:04

## 2022-04-14 RX ADMIN — SODIUM CHLORIDE: 9 INJECTION, SOLUTION INTRAVENOUS at 10:00

## 2022-04-14 RX ADMIN — LIDOCAINE HYDROCHLORIDE 60 MG: 20 INJECTION, SOLUTION EPIDURAL; INFILTRATION; INTRACAUDAL; PERINEURAL at 10:04

## 2022-04-14 RX ADMIN — SODIUM CHLORIDE: 9 INJECTION, SOLUTION INTRAVENOUS at 08:57

## 2022-04-14 ASSESSMENT — PULMONARY FUNCTION TESTS
PIF_VALUE: 1
PIF_VALUE: 0
PIF_VALUE: 1

## 2022-04-14 ASSESSMENT — PAIN SCALES - GENERAL
PAINLEVEL_OUTOF10: 0

## 2022-04-14 ASSESSMENT — PAIN - FUNCTIONAL ASSESSMENT: PAIN_FUNCTIONAL_ASSESSMENT: 0-10

## 2022-04-14 NOTE — PROGRESS NOTES
Medications administered and monitored by CRNA, see anesthesia record.   Electronically signed by Chidi Zambrano RN on 4/14/2022 at 10:04 AM

## 2022-04-14 NOTE — ANESTHESIA PRE PROCEDURE
Department of Anesthesiology  Preprocedure Note       Name:  Maia Sweet   Age:  61 y.o.  :  1965                                          MRN:  [id number]         Date:  2022      Surgeon: Supa Emerson):  Carlos Ernst,     Procedure: Procedure(s):  COLONOSCOPY WITH CHROMOENDOSCOPY    Medications prior to admission:   Prior to Admission medications    Medication Sig Start Date End Date Taking? Authorizing Provider   sodium chloride 0.9 % SOLN 250 mL with vedolizumab 300 MG SOLR 300 mg Infuse 300 mg intravenously See Admin Instructions Every 4 weeks    Historical Provider, MD   ondansetron (ZOFRAN) 4 MG tablet Take 4 mg by mouth every 8 hours as needed for Nausea or Vomiting    Historical Provider, MD   predniSONE (DELTASONE) 5 MG tablet Take 20mg by mouth (4 tabs) for 3 days, then 10mg by mouth (2 tabs) for 3 days, then 5mg by mouth daily  Patient taking differently: Take 5 mg by mouth daily  18   Brenna Negrete MD   dicyclomine (BENTYL) 20 MG tablet Take 1 tablet by mouth 4 times daily as needed 18   Historical Provider, MD   mercaptopurine (PURINETHOL) 50 MG chemo tablet Take 75 mg by mouth daily Take one and a half  by mouth daily for Chrons 18   Historical Provider, MD   zolpidem (AMBIEN) 10 MG tablet Take 5 mg by mouth nightly as needed for Sleep.      Historical Provider, MD   nadolol (CORGARD) 20 MG tablet Take 20 mg by mouth daily    Historical Provider, MD   sertraline (ZOLOFT) 25 MG tablet Take 25 mg by mouth daily    Historical Provider, MD   omeprazole (PRILOSEC) 20 MG delayed release capsule Take 20 mg by mouth daily    Historical Provider, MD   fluticasone (FLONASE) 50 MCG/ACT nasal spray 2 sprays by Nasal route daily as needed     Historical Provider, MD       Current medications:    Current Facility-Administered Medications   Medication Dose Route Frequency Provider Last Rate Last Admin    0.9 % sodium chloride infusion   IntraVENous Continuous Hira Alaniz  mL/hr at 04/14/22 0857 New Bag at 04/14/22 0857    sodium chloride flush 0.9 % injection 10 mL  10 mL IntraVENous 2 times per day Sundeep Blood MD        sodium chloride flush 0.9 % injection 10 mL  10 mL IntraVENous PRN Sundeep Blood MD        0.9 % sodium chloride infusion  25 mL IntraVENous PRN Sundeep Blood MD        indigotindisulfonate [facility]) injection 240 mg  240 mg Irrigation Once Rosa Maria Zavala DO           Allergies:  No Known Allergies    Problem List:    Patient Active Problem List   Diagnosis Code    Colitis K52.9       Past Medical History:        Diagnosis Date    [facility]     Anxiety     Cancer ([facility]     squamous cell skin cancer    Clostridium difficile diarrhea     Sleep apnea     uses a Cpap machine       Past Surgical History:        Procedure Laterality Date    BUNIONECTOMY Right     CHOLECYSTECTOMY  2009    COLONOSCOPY N/A 2/10/2022    COLONOSCOPY  WITH CHROMOENDOSCOPY performed by Rosa Maria Zavala DO at [address] COLONOSCOPY N/A 2/10/2022    COLONOSCOPY POLYPECTOMY SNARE/COLD BIOPSY performed by Rosa Maria Zavala DO at [address] Right     right foot    MOHS SURGERY      forehead       Social History:    Social History     Tobacco Use    Smoking status: Never Smoker    Smokeless tobacco: Never Used   Substance Use Topics    Alcohol use:  No                                Counseling given: Not Answered      Vital Signs (Current):   Vitals:    04/05/22 1434 04/14/22 0843   BP:  124/86   Pulse:  68   Resp:  16   Temp:  97.2 °F (36.2 °C)   TempSrc:  Temporal   SpO2:  96%   Weight: 185 lb (83.9 kg) 182 lb 8.7 oz (82.8 kg)   Height: 5' 9\" (1.753 m) 5' 9\" (1.753 m)                                              BP Readings from Last 3 Encounters:   04/14/22 124/86   02/10/22 103/64   02/10/22 111/74       NPO Status: Time of last liquid consumption: 0400                        Time of last solid consumption: 0530                        Date of last liquid consumption: 04/14/22                        Date of last solid food consumption: 04/13/22    BMI:   Wt Readings from Last 3 Encounters:   04/14/22 182 lb 8.7 oz (82.8 kg)   02/10/22 181 lb 10.5 oz (82.4 kg)   09/20/18 189 lb 6 oz (85.9 kg)     Body mass index is 26.96 kg/m². CBC:   Lab Results   Component Value Date    WBC 8.5 02/07/2020    RBC 5.03 02/07/2020    HGB 16.8 02/07/2020    HCT 48.4 02/07/2020    MCV 96.3 02/07/2020    RDW 13.3 02/07/2020     02/07/2020       CMP:   Lab Results   Component Value Date     02/07/2020    K 4.8 02/07/2020    K 3.5 05/29/2018    CL 97 02/07/2020    CO2 22 02/07/2020    BUN 24 02/07/2020    CREATININE 1.3 02/07/2020    GFRAA >60 02/07/2020    AGRATIO 2.0 02/07/2020    LABGLOM 57 02/07/2020    GLUCOSE 94 02/07/2020    PROT 7.4 02/07/2020    CALCIUM 9.5 02/07/2020    BILITOT 0.8 02/07/2020    ALKPHOS 64 02/07/2020    AST 26 02/07/2020    ALT 30 02/07/2020       POC Tests: No results for input(s): POCGLU, POCNA, POCK, POCCL, POCBUN, POCHEMO, POCHCT in the last 72 hours.     Coags: No results found for: PROTIME, INR, APTT    HCG (If Applicable): No results found for: PREGTESTUR, PREGSERUM, HCG, HCGQUANT     ABGs: No results found for: PHART, PO2ART, KPX9HSH, TPS8KFZ, BEART, H6GPXZYD     Type & Screen (If Applicable):  No results found for: LABABO, LABRH    Drug/Infectious Status (If Applicable):  No results found for: HIV, HEPCAB    COVID-19 Screening (If Applicable): No results found for: COVID19        Anesthesia Evaluation  Patient summary reviewed no history of anesthetic complications:   Airway: Mallampati: II  TM distance: >3 FB   Neck ROM: full  Mouth opening: > = 3 FB Dental: normal exam         Pulmonary:   (+) sleep apnea: on CPAP,                             Cardiovascular:    (+) hypertension:,     (-) past MI, CABG/stent and  angina       Beta Blocker:  Dose within 24 Hrs         Neuro/Psych: (+) depression/anxiety    (-) seizures and CVA           GI/Hepatic/Renal:        (-) liver disease and no renal disease      ROS comment: Crohn's. Endo/Other:        (-) diabetes mellitus               Abdominal:             Vascular: Other Findings:             Anesthesia Plan      MAC     ASA 2       Induction: intravenous. Anesthetic plan and risks discussed with patient. Plan discussed with CRNA. This pre-anesthesia assessment may be used as a history and physical.    DOS STAFF ADDENDUM:    Pt seen and examined, chart reviewed (including anesthesia, drug and allergy history). No interval changes to history and physical examination. Anesthetic plan, risks, benefits, alternatives, and personnel involved discussed with patient. Patient verbalized an understanding and agrees to proceed.       Judith Alonzo MD  April 14, 2022  9:02 AM

## 2022-04-14 NOTE — OP NOTE
Colonoscopy Procedure Note      Patient: Stalin Conteh  : 1965  Acct#:     Procedure: Colonoscopy with chromoendoscopy, polypectomy (cold snare), endoscopic mucosal resection, clip application x 2, argon plasma coagulation    Date:  2022    Surgeon:  Padilla Umaña DO    Referring Physician:  Allen Manley MD    Previous Colonoscopy: YES  Date: 2022  Greater than 3 years: N/A    Preoperative Diagnosis:  1) Crohn's colitis with focal high grade dysplasia    Postoperative Diagnosis:  1) Successful chromoendoscopy using methylene blue was performed per protocol using a braydon-tip spray catheter. 2) The right colon without nodularity had targeted biopsies were taken every 10 cm x 4 quadrants. 3) A 11mm  Ilda 0-IIb polyp in the ascending colon was removed completely with cold snare polypectomy. This polypectomy site was closed with 2 endoclips. 4) There was some 8mm area of focal nodular mucosa noted in the transverse colon. This was removed with cold snare polypectomy. 5) There were two sessile parisdescending 8mm and 10mm colon polyps removed with cold snare polypectomy. 6)  The left colon was examined. This was also biopsied using targeted biopsies every 10 cm x 4 quadrants. 7) There was a 2.5-3 cm area of polypoid changes. This was 10 cm from the anal verge. This was [employer]. This was lifted with Orise. This was then removed with piecemeal EMR using several stiff snares. There was some areas of this area not amenable to snare resection and these were removed with cold biopsy forceps using avulsion technique. The periphery and center of the polyp were treated with APC using a front firing catheter and rectal APC settings. Consent:  The patient or their legal guardian has signed a consent, and is aware of the potential risks, benefits, alternatives, and potential complications of this procedure.   These include, but are not limited to hemorrhage, bleeding, post procedural pain, perforation, phlebitis, aspiration, hypotension, hypoxia, cardiovascular events such as arryhthmia, and possibly death. Additionally, the possibility of missed colonic polyps and interval colon cancer was discussed in the consent. Anesthesia:  The patient was administered TIVA per anesthesiology team.  Please see their operative records for full details of medications administered. Procedure: An informed consent was obtained from the patient after explanation of indications, benefits, possible risks and complications of the procedure. The patient was then taken to the endoscopy suite, placed in the left lateral decubitus position, and the above IV anesthesia was administered. A digital rectal examination was performed and revealed negative without mass, lesions or tenderness, internal hemorrhoids noted. The Olympus video colonoscope was placed in the patient's rectum under digital direction and advanced to the cecum. The cecum was identified by characteristic anatomy and ballottment. The ileocecal valve was identified. The preparation was good. The terminal ileum was normal appearing. The scope was then withdrawn back through the cecum, ascending, transverse, descending, sigmoid colon, and rectum. Careful circumferential examination of the mucosa in these areas demonstrated:    1) Successful chromoendoscopy using methylene blue was performed per protocol using a braydon-tip spray catheter. 2) The right colon without nodularity had targeted biopsies were taken every 10 cm x 4 quadrants. 3) A 11mm  Ilda 0-IIb polyp in the ascending colon was removed completely with cold snare polypectomy. This polypectomy site was closed with 2 endoclips. 4) There was some 8mm area of focal nodular mucosa noted in the transverse colon. This was removed with cold snare polypectomy.   5) There were two sessile parisdescending 8mm and 10mm colon polyps removed with cold snare polypectomy. 6)  The left colon was examined. This was also biopsied using targeted biopsies every 10 cm x 4 quadrants. 7) There was a 2.5-3 cm area of polypoid changes. This was 10 cm from the anal verge. This was [employer]. This was lifted with Orise. This was then removed with piecemeal EMR using several stiff snares. There was some areas of this area not amenable to snare resection and these were removed with cold biopsy forceps using avulsion technique. The periphery and center of the polyp were treated with APC using a front firing catheter and rectal APC settings. The scope was then withdrawn into the rectum and retroflexed. The retroflexed view of the anal verge and rectum demonstrates moderate internal hemorrhoids. The scope was straightened, the colon was decompressed and the scope was withdrawn from the patient. The patient tolerated the procedure well and was taken to the PACU in good condition. Estimated blood loss: <10ml    ID Type Source Tests Collected by Time Destination   A : Right Colon Biopsy Tissue Colon SURGICAL PATHOLOGY David Lambrigid., DO 4/14/2022 1019    B : Ascending Colon Polyp Tissue [facility] 4/14/2022 1021    C : Transverse Colon Nodularity Tissue Colon SURGICAL PATHOLOGY Janeal Lama., DO 4/14/2022 1032    D : Left Colon Random Biopsy Tissue Colon SURGICAL PATHOLOGY Janeal Lama., DO 4/14/2022 1033    E : Descending Colon Polyp X2 Tissue Colon SURGICAL PATHOLOGY Janeal Lama., DO 4/14/2022 1034    F : Rectal Polyp Tissue Colon [facility] 4/14/2022 1041          Impression:  See post-procedure diagnoses. Recommendations:  Await pathology. Further recommendations will follow after the biopsies are all reviewed    The patient had colon polyp(s) successfully removed today.  Darcia Host is a small risk for these sites to bleed for up to several weeks out from the procedure. The patient is instructed to call if there is any significant amounts of  rectal bleeding, abdominal pain, dizziness, or other complaints thought to be related to the procedure. The patient had a metallic clipping device applied today during their procedure to decrease the risk of post polypectomy bleeding. These clips will spontaneously pass out with the stool in the next several weeks. The patient should not have an MRI for the next 30 days.        Shae Liao, [address] [facility] [address] and [facility]  4/14/2022  [phone]

## 2022-04-14 NOTE — PROGRESS NOTES
Tolerating oral intake and being up on side of bed. Discharge instructions given to patient and wife. Verbalize understanding. No complaints.

## 2022-04-14 NOTE — ANESTHESIA POSTPROCEDURE EVALUATION
Department of Anesthesiology  Postprocedure Note    Patient: Jose Maria Patel  MRN: [id number]  YOB: 1965  Date of evaluation: 4/14/2022  Time:  11:23 AM     Procedure Summary     Date: 04/14/22 Room / Location: [address] [place] [address]    Anesthesia Start: 1000 Anesthesia Stop: 1115    Procedures:       COLONOSCOPY WITH CHROMOENDOSCOPY (N/A )      COLONOSCOPY POLYPECTOMY SNARE/COLD BIOPSY      COLONOSCOPY SUBMUCOSAL ORISE INJECTION      COLONOSCOPY W/ ENDOSCOPIC MUCOSAL RESECTION      COLONOSCOPY POLYPECTOMY ABLATION- APC Diagnosis:       Crohn's disease of colon with complication ([facility]      (crohns colitis)    Surgeons: Colin Gonzalez DO Responsible Provider: Di Peters MD    Anesthesia Type: MAC ASA Status: 2          Anesthesia Type: MAC    Mara Phase I: Mara Score: 8    Mara Phase II:      Last vitals: Reviewed and per EMR flowsheets.        Anesthesia Post Evaluation    Patient location during evaluation: bedside  Patient participation: complete - patient participated  Level of consciousness: awake and alert  Pain score: 0  Nausea & Vomiting: no nausea  Complications: no  Cardiovascular status: hemodynamically stable  Respiratory status: acceptable  Hydration status: stable

## 2022-04-14 NOTE — PROGRESS NOTES
Discharge instructions given to patient and wife. Verbalize understanding. Clip card given. No complaints.

## 2022-04-14 NOTE — H&P
Pre-operative History and Physical    Patient: Odilia Romero  : 1965  Acct#:     Intended Procedure: Colonoscopy     HISTORY OF PRESENT ILLNESS:  The patient is a 61 y.o. male  who presents for/due to Crohn's colitis with high grade dysplasia      Past Medical History:        Diagnosis Date    [facility]     Anxiety     Cancer ([facility]     squamous cell skin cancer    Clostridium difficile diarrhea     Sleep apnea     uses a Cpap machine     Past Surgical History:        Procedure Laterality Date    BUNIONECTOMY Right     CHOLECYSTECTOMY      COLONOSCOPY N/A 2/10/2022    COLONOSCOPY  WITH CHROMOENDOSCOPY performed by Santos Alvares DO at [address]  COLONOSCOPY N/A 2/10/2022    COLONOSCOPY POLYPECTOMY SNARE/COLD BIOPSY performed by Santos Alvares DO at  [address] Right     right foot    MOHS SURGERY      forehead     Medications Prior to Admission:   No current facility-administered medications on file prior to encounter. Current Outpatient Medications on File Prior to Encounter   Medication Sig Dispense Refill    sodium chloride 0.9 % SOLN 250 mL with vedolizumab 300 MG SOLR 300 mg Infuse 300 mg intravenously See Admin Instructions Every 4 weeks      ondansetron (ZOFRAN) 4 MG tablet Take 4 mg by mouth every 8 hours as needed for Nausea or Vomiting      predniSONE (DELTASONE) 5 MG tablet Take 20mg by mouth (4 tabs) for 3 days, then 10mg by mouth (2 tabs) for 3 days, then 5mg by mouth daily (Patient taking differently: Take 5 mg by mouth daily ) 30 tablet 0    dicyclomine (BENTYL) 20 MG tablet Take 1 tablet by mouth 4 times daily as needed      mercaptopurine (PURINETHOL) 50 MG chemo tablet Take 75 mg by mouth daily Take one and a half  by mouth daily for Chrons      zolpidem (AMBIEN) 10 MG tablet Take 5 mg by mouth nightly as needed for Sleep.        nadolol (CORGARD) 20 MG tablet Take 20 mg by mouth daily      sertraline (ZOLOFT) 25 MG tablet Take 25 mg by mouth daily      omeprazole (PRILOSEC) 20 MG delayed release capsule Take 20 mg by mouth daily      fluticasone (FLONASE) 50 MCG/ACT nasal spray 2 sprays by Nasal route daily as needed           Allergies:  Patient has no known allergies. Social History:   TOBACCO:   reports that he has never smoked. He has never used smokeless tobacco.  ETOH:   reports no history of alcohol use. DRUGS:   reports no history of drug use. PHYSICAL EXAM:      Vital Signs: /86   Pulse 68   Temp 97.2 °F (36.2 °C) (Temporal)   Resp 16   Ht 5' 9\" (1.753 m)   Wt 182 lb 8.7 oz (82.8 kg)   SpO2 96%   BMI 26.96 kg/m²    Airway: No stridor or wheezing noted. Good air movement  Pulmonary: without wheezes. Clear to auscultation  Cardiac:regular rate and rhythm without loud murmurs  Abdomen:soft, nontender,  Bowel sounds present    Pre-Procedure Assessment / Plan:  1) Crohn's colitis with high grade dysplasia    ASA Grade:  ASA 3 - Patient with moderate systemic disease with functional limitations  Mallampati Classification:  Class II    Level of Sedation Plan:Deep sedation    Post Procedure plan: Return to same level of care    I assessed the patient and find that the patient is in satisfactory condition to proceed with the planned procedure and sedation plan. I have explained the risk, benefits, and alternatives to the procedure; the patient understands and agrees to proceed. The patient was counseled at length about the risks of dean Covid-19 during their perioperative period and any recovery window from their procedure. The patient was made aware that dean Covid-19  may worsen their prognosis for recovering from their procedure  and lend to a higher morbidity and/or mortality risk. All material risks, benefits, and reasonable alternatives including postponing the procedure were discussed.  The patient does wish to proceed with the procedure at this

## (undated) DEVICE — SNARES COLD OVAL 10MM THIN

## (undated) DEVICE — ENDOSCOPY KIT: Brand: MEDLINE INDUSTRIES, INC.

## (undated) DEVICE — SNARE ENDOSCP L240CM W15MM SHTH DIA2.4MM CHN 2.8MM STIFF

## (undated) DEVICE — GLO-TIP SPRAY CATHETER: Brand: GLO-TIP

## (undated) DEVICE — CLIP HEMO L235CM WRK CHN DIA OPN 17MM BRAID CATH ROT

## (undated) DEVICE — REPLAY HEMOSTASIS CLIP, 16MM SPAN: Brand: REPLAY

## (undated) DEVICE — FIAPC® PROBE W/ FILTER 2200 A OD 2.3MM/6.9FR; L 2.2M/7.2FT: Brand: ERBE

## (undated) DEVICE — SNARE ENDOSCP L240CM SHTH DIA2.4MM LOOP W20MM MIN WRK CHN

## (undated) DEVICE — FORCEPS BX 240CM 2.4MM L NDL RAD JAW 4 M00513334

## (undated) DEVICE — CONTAINER SPEC 480ML CLR POLYSTYR 10% NEUT BUFF FRMLN ZN

## (undated) DEVICE — ELECTRODE PT RET AD L9FT HI MOIST COND ADH HYDRGEL CORDED

## (undated) DEVICE — NEEDLE 25GAX5.0MM INJ CARR LOCKE